# Patient Record
Sex: FEMALE | Race: BLACK OR AFRICAN AMERICAN | NOT HISPANIC OR LATINO | Employment: OTHER | URBAN - METROPOLITAN AREA
[De-identification: names, ages, dates, MRNs, and addresses within clinical notes are randomized per-mention and may not be internally consistent; named-entity substitution may affect disease eponyms.]

---

## 2023-05-19 ENCOUNTER — HOSPITAL ENCOUNTER (OUTPATIENT)
Dept: DATA CONVERSION | Facility: HOSPITAL | Age: 88
End: 2023-05-19
Attending: PHYSICAL MEDICINE & REHABILITATION | Admitting: PHYSICAL MEDICINE & REHABILITATION
Payer: MEDICARE

## 2023-05-19 DIAGNOSIS — M54.12 RADICULOPATHY, CERVICAL REGION: ICD-10-CM

## 2023-08-02 ENCOUNTER — HOSPITAL ENCOUNTER (OUTPATIENT)
Dept: DATA CONVERSION | Facility: HOSPITAL | Age: 88
End: 2023-08-02
Attending: PHYSICAL MEDICINE & REHABILITATION | Admitting: PHYSICAL MEDICINE & REHABILITATION
Payer: MEDICARE

## 2023-08-02 DIAGNOSIS — M96.1 POSTLAMINECTOMY SYNDROME, NOT ELSEWHERE CLASSIFIED: ICD-10-CM

## 2023-09-07 VITALS
HEART RATE: 79 BPM | RESPIRATION RATE: 16 BRPM | HEIGHT: 63 IN | SYSTOLIC BLOOD PRESSURE: 180 MMHG | DIASTOLIC BLOOD PRESSURE: 83 MMHG | WEIGHT: 175.04 LBS | TEMPERATURE: 98.6 F | BODY MASS INDEX: 31.02 KG/M2

## 2023-09-27 PROBLEM — M54.50 CHRONIC LOW BACK PAIN: Status: ACTIVE | Noted: 2023-09-27

## 2023-09-27 PROBLEM — I10 HYPERTENSION: Status: ACTIVE | Noted: 2023-09-27

## 2023-09-27 PROBLEM — M19.012 OSTEOARTHRITIS OF LEFT SHOULDER: Status: ACTIVE | Noted: 2023-09-27

## 2023-09-27 PROBLEM — M75.02 ADHESIVE BURSITIS OF LEFT SHOULDER: Status: ACTIVE | Noted: 2023-09-27

## 2023-09-27 PROBLEM — M77.8 LEFT SHOULDER TENDINITIS: Status: ACTIVE | Noted: 2023-09-27

## 2023-09-27 PROBLEM — M47.812 SPONDYLOSIS OF CERVICAL REGION WITHOUT MYELOPATHY OR RADICULOPATHY: Status: ACTIVE | Noted: 2023-09-27

## 2023-09-27 PROBLEM — M94.9 DISORDER OF BONE AND CARTILAGE: Status: ACTIVE | Noted: 2023-09-27

## 2023-09-27 PROBLEM — M48.061 LUMBAR CANAL STENOSIS: Status: ACTIVE | Noted: 2023-09-27

## 2023-09-27 PROBLEM — M79.7 FIBROMYALGIA: Status: ACTIVE | Noted: 2023-09-27

## 2023-09-27 PROBLEM — G57.92 NEURITIS OF FOOT, LEFT: Status: ACTIVE | Noted: 2023-09-27

## 2023-09-27 PROBLEM — M17.9 OSTEOARTHRITIS OF KNEE: Status: ACTIVE | Noted: 2023-09-27

## 2023-09-27 PROBLEM — M17.0 OSTEOARTHRITIS OF KNEES, BILATERAL: Status: ACTIVE | Noted: 2023-09-27

## 2023-09-27 PROBLEM — M43.10 ACQUIRED SPONDYLOLISTHESIS: Status: ACTIVE | Noted: 2023-09-27

## 2023-09-27 PROBLEM — F03.90 DEMENTIA (MULTI): Status: ACTIVE | Noted: 2023-09-27

## 2023-09-27 PROBLEM — M54.16 LUMBAR RADICULOPATHY: Status: ACTIVE | Noted: 2023-09-27

## 2023-09-27 PROBLEM — G56.02 LEFT CARPAL TUNNEL SYNDROME: Status: ACTIVE | Noted: 2023-09-27

## 2023-09-27 PROBLEM — M88.9 PAGET DISEASE OF BONE: Status: ACTIVE | Noted: 2023-09-27

## 2023-09-27 PROBLEM — M75.80 ROTATOR CUFF TENDINITIS: Status: ACTIVE | Noted: 2023-09-27

## 2023-09-27 PROBLEM — M54.12 LEFT CERVICAL RADICULOPATHY: Status: ACTIVE | Noted: 2023-09-27

## 2023-09-27 PROBLEM — G89.29 CHRONIC LOW BACK PAIN: Status: ACTIVE | Noted: 2023-09-27

## 2023-09-27 PROBLEM — G57.91 NEURITIS OF RIGHT FOOT: Status: ACTIVE | Noted: 2023-09-27

## 2023-09-27 PROBLEM — M54.12 CERVICAL NEURITIS: Status: ACTIVE | Noted: 2023-09-27

## 2023-09-27 PROBLEM — M54.2 CHRONIC NECK PAIN: Status: ACTIVE | Noted: 2023-09-27

## 2023-09-27 PROBLEM — S42.293A CLOSED FRACTURE OF ANATOMICAL NECK OF HUMERUS: Status: ACTIVE | Noted: 2023-09-27

## 2023-09-27 PROBLEM — R06.00 DYSPNEA: Status: ACTIVE | Noted: 2023-09-27

## 2023-09-27 PROBLEM — M19.079 ARTHRITIS OF MIDFOOT: Status: ACTIVE | Noted: 2023-09-27

## 2023-09-27 PROBLEM — R42 DIZZINESS: Status: ACTIVE | Noted: 2023-09-27

## 2023-09-27 PROBLEM — I87.2 VENOUS INSUFFICIENCY: Status: ACTIVE | Noted: 2023-09-27

## 2023-09-27 PROBLEM — M21.40 PES PLANUS: Status: ACTIVE | Noted: 2023-09-27

## 2023-09-27 PROBLEM — M96.1 POSTLAMINECTOMY SYNDROME, LUMBAR: Status: ACTIVE | Noted: 2023-09-27

## 2023-09-27 PROBLEM — S92.301A FRACTURE OF UNSPECIFIED METATARSAL BONE(S), RIGHT FOOT, INITIAL ENCOUNTER FOR CLOSED FRACTURE: Status: ACTIVE | Noted: 2023-09-27

## 2023-09-27 PROBLEM — M50.20 DISC DISPLACEMENT, CERVICAL: Status: ACTIVE | Noted: 2023-09-27

## 2023-09-27 PROBLEM — G89.29 CHRONIC NECK PAIN: Status: ACTIVE | Noted: 2023-09-27

## 2023-09-27 PROBLEM — M70.60 TROCHANTERIC BURSITIS: Status: ACTIVE | Noted: 2023-09-27

## 2023-09-27 PROBLEM — M89.9 DISORDER OF BONE AND CARTILAGE: Status: ACTIVE | Noted: 2023-09-27

## 2023-09-27 PROBLEM — G62.9 NEUROPATHY: Status: ACTIVE | Noted: 2023-09-27

## 2023-09-27 PROBLEM — H90.3 SENSORY HEARING LOSS, BILATERAL: Status: ACTIVE | Noted: 2023-09-27

## 2023-09-27 PROBLEM — D64.9 ANEMIA: Status: ACTIVE | Noted: 2023-09-27

## 2023-09-27 RX ORDER — DONEPEZIL HYDROCHLORIDE 5 MG/1
1 TABLET, FILM COATED ORAL NIGHTLY
COMMUNITY
Start: 2023-04-03

## 2023-09-27 RX ORDER — LOSARTAN POTASSIUM 25 MG/1
1 TABLET ORAL DAILY
COMMUNITY
Start: 2013-02-14

## 2023-09-27 RX ORDER — CEFTRIAXONE 2 G/1
INJECTION, POWDER, FOR SOLUTION INTRAMUSCULAR; INTRAVENOUS
COMMUNITY

## 2023-09-27 RX ORDER — LOSARTAN POTASSIUM 100 MG/1
1 TABLET ORAL DAILY
COMMUNITY
Start: 2023-06-05

## 2023-09-27 RX ORDER — VANCOMYCIN HYDROCHLORIDE 1 G/20ML
INJECTION, POWDER, LYOPHILIZED, FOR SOLUTION INTRAVENOUS
COMMUNITY

## 2023-09-27 RX ORDER — GABAPENTIN 100 MG/1
CAPSULE ORAL
COMMUNITY
Start: 2023-02-22

## 2023-09-27 RX ORDER — DULOXETIN HYDROCHLORIDE 60 MG/1
1 CAPSULE, DELAYED RELEASE ORAL 2 TIMES DAILY
COMMUNITY
Start: 2023-01-18

## 2023-09-27 RX ORDER — FUROSEMIDE 20 MG/1
TABLET ORAL
COMMUNITY

## 2023-09-27 RX ORDER — FUROSEMIDE 40 MG/1
TABLET ORAL
COMMUNITY
Start: 2023-07-26

## 2023-09-27 RX ORDER — DULOXETIN HYDROCHLORIDE 30 MG/1
1 CAPSULE, DELAYED RELEASE ORAL DAILY
COMMUNITY
Start: 2023-04-11

## 2023-09-27 RX ORDER — TRAZODONE HYDROCHLORIDE 50 MG/1
1 TABLET ORAL NIGHTLY PRN
COMMUNITY
Start: 2023-07-11

## 2023-09-27 RX ORDER — ESCITALOPRAM OXALATE 20 MG/1
1 TABLET ORAL DAILY
COMMUNITY
Start: 2023-07-11

## 2023-09-27 RX ORDER — OXYCODONE AND ACETAMINOPHEN 5; 325 MG/1; MG/1
1 TABLET ORAL 2 TIMES DAILY PRN
COMMUNITY

## 2023-09-27 RX ORDER — DONEPEZIL HYDROCHLORIDE 10 MG/1
1 TABLET, FILM COATED ORAL NIGHTLY
COMMUNITY
Start: 2023-06-05

## 2023-09-29 VITALS
DIASTOLIC BLOOD PRESSURE: 86 MMHG | HEART RATE: 65 BPM | SYSTOLIC BLOOD PRESSURE: 185 MMHG | TEMPERATURE: 97.2 F | RESPIRATION RATE: 16 BRPM

## 2023-09-30 NOTE — H&P
History of Present Illness:   History Present Illness:  Reason for surgery: Neck pain   HPI:    89 year old female scheduled for JOSE A.     Allergies:        Allergies:  ·  trazodone : Other    Home Medication Review:   Home Medications Reviewed: yes     Impression/Procedure:   ·  Impression and Planned Procedure: 89 year old F scheduled for JOSE A 7/T1 for cervical radiculitis.       ERAS (Enhanced Recovery After Surgery):  ·  ERAS Patient: no       Vital Signs:  Temperature C: 37 degrees C   Temperature F: 98.6 degrees F   Heart Rate: 79 beats per minute   Respiratory Rate: 16 breath per minute   Blood Pressure Systolic: 180 mm/Hg   Blood Pressure Diastolic: 83 mm/Hg     Physical Exam by System:    Respiratory/Thorax: Patent airways, CTAB, normal  breath sounds with good chest expansion, thorax symmetric   Cardiovascular: Regular, rate and rhythm, no murmurs,  2+ equal pulses of the extremities, normal S 1and S 2     Consent:   COVID-19 Consent:  ·  COVID-19 Risk Consent Surgeon has reviewed key risks related to the risk of rajan COVID-19 and if they contract COVID-19 what the risks are.     Attestation:   Note Completion:  I am a:  Resident/Fellow   Attending Attestation I saw and evaluated the patient.  I personally obtained the key and critical portions of the history and physical exam or was physically present for key and  critical portions performed by the resident/fellow. I reviewed the resident/fellow?s documentation and discussed the patient with the resident/fellow.  I agree with the resident/fellow?s medical decision making as documented in the note.   I personally evaluated the patient on 19-May-2023         Electronic Signatures:  Toni Epps (Fellow))  (Signed 19-May-2023 10:46)   Authored: History of Present Illness, Allergies, Home  Medication Review, Impression/Procedure, ERAS, Physical Exam, Consent, Note Completion  Ash Yousif)  (Signed 19-May-2023 10:48)   Authored: Note  Completion   Co-Signer: History of Present Illness, Allergies, Home Medication Review, Impression/Procedure, ERAS, Physical Exam, Consent, Note Completion      Last Updated: 19-May-2023 10:48 by Ash Yousif)

## 2023-09-30 NOTE — H&P
History of Present Illness:   History Present Illness:  Reason for surgery: Postlaminectomy syndrome   HPI:    Patient with lumbar postlaminectomy syndrome here for caudal epidural steroid injection.  No changes since last visit.    Allergies:        Allergies:  ·  trazodone : Other    Home Medication Review:   Home Medications Reviewed: yes     Impression/Procedure:   ·  Impression and Planned Procedure: Lumbar postlaminectomy syndrome-caudal JULI       ERAS (Enhanced Recovery After Surgery):  ·  ERAS Patient: no       Vital Signs:  Temperature C: 36.2 degrees C   Temperature F: 97.1 degrees F   Heart Rate: 65 beats per minute   Respiratory Rate: 16 breath per minute   Blood Pressure Systolic: 185 mm/Hg   Blood Pressure Diastolic: 86 mm/Hg     Physical Exam by System:    Constitutional: Well developed, awake/alert/oriented  x3, no distress, alert and cooperative   Eyes: PERRL, EOMI, clear sclera   ENMT: mucous membranes moist, no apparent injury,  no lesions seen   Head/Neck: Neck supple, no apparent injury, thyroid  without mass or tenderness, No JVD, trachea midline, no bruits   Respiratory/Thorax: unlabored breathing   Cardiovascular: no edema   Skin: Warm and dry, no lesions, no rashes     Consent:   COVID-19 Consent:  ·  COVID-19 Risk Consent Surgeon has reviewed key risks related to the risk of rajan COVID-19 and if they contract COVID-19 what the risks are.       Electronic Signatures:  Ash Yousif)  (Signed 02-Aug-2023 11:29)   Authored: History of Present Illness, Allergies, Home  Medication Review, Impression/Procedure, ERAS, Physical Exam, Consent, Note Completion      Last Updated: 02-Aug-2023 11:29 by Ash Yousif)

## 2023-10-01 NOTE — OP NOTE
Post Operative Note:     PreOp Diagnosis: Lumbar postlaminectomy syndrome   Post-Procedure Diagnosis: Lumbar postlaminectomy  syndrome   Procedure: 1.  Caudal JULI  2.   3.   4.   5.   Surgeon: Ash Yousif MD   Resident/Fellow/Other Assistant: none   Estimated Blood Loss (mL): none   Specimen: no   Complications: none apparent   Findings: expected anatomy     Operative Report Dictated:  Dictation: not applicable - note contains Operative  Report   Operative Report:    The patient was positioned comfortably into the supine position and was prepped and draped in the usual sterile manner.  Sterile precautions, including sterile gloves,  disposable cap and masks were worn for the procedure at all times. Skeletal landmarks were identified under fluoroscopy. There was no evidence of infection at the site(s) of needle insertion.  A final time-out was done.  The skin and subcutaneous tissue  at insertion site was anesthetized with 1% lidocaine with or without sodium bicarbonate.  Under fluoroscopic guidance, the needle listed below was placed into the epidural space through the caudal approach.  If an Epimed Catheter was used it was advanced  to the below level.  Radio-opaque contrast was utilized to confirm position.  Appropriate epidural spread was observed without vascular uptake or spread suggestive of an intrathecal injection.   Blood was not aspirated.  CSF was not aspirated.  Paresthesias  were not elicited.  A therapeutic solution as indicated below was injected.  If used, the catheter was flushed and the catheter and needle were removed as a unit with tip of the catheter noted to be intact.  Pressure was held over the site until hemostasis  was achieved, and after ensuring hemostasis and the absence of hematoma, an adhesive bandage was applied to the site of needle insertion.  Preservative-free solutions were utilized in the epidural space.      Needle type and catheter: [22 gauge Quincke Spinal]       Level catheter advanced if used: [N/A]  Medications [9 mL of 0.5% lidocaine and 40 mg methylprednisolone]      Electronic Signatures:  Ash Yousif (MD)  (Signed 02-Aug-2023 12:37)   Authored: Post Operative Note, Note Completion      Last Updated: 02-Aug-2023 12:37 by Ash Yousif)

## 2023-10-02 ENCOUNTER — HOSPITAL ENCOUNTER (OUTPATIENT)
Dept: RADIOLOGY | Facility: HOSPITAL | Age: 88
Discharge: HOME | End: 2023-10-02
Payer: MEDICARE

## 2023-10-02 ENCOUNTER — OFFICE VISIT (OUTPATIENT)
Dept: ORTHOPEDIC SURGERY | Facility: HOSPITAL | Age: 88
End: 2023-10-02
Payer: MEDICARE

## 2023-10-02 DIAGNOSIS — M17.9 OSTEOARTHRITIS OF KNEE, UNSPECIFIED: ICD-10-CM

## 2023-10-02 DIAGNOSIS — M17.12 PRIMARY OSTEOARTHRITIS OF LEFT KNEE: ICD-10-CM

## 2023-10-02 DIAGNOSIS — S83.105D LEFT KNEE DISLOCATION, SUBSEQUENT ENCOUNTER: Primary | ICD-10-CM

## 2023-10-02 PROCEDURE — 99214 OFFICE O/P EST MOD 30 MIN: CPT | Performed by: ORTHOPAEDIC SURGERY

## 2023-10-02 PROCEDURE — 73560 X-RAY EXAM OF KNEE 1 OR 2: CPT | Mod: LEFT SIDE | Performed by: RADIOLOGY

## 2023-10-02 PROCEDURE — 73560 X-RAY EXAM OF KNEE 1 OR 2: CPT | Mod: LT,FY

## 2023-10-02 PROCEDURE — 99204 OFFICE O/P NEW MOD 45 MIN: CPT | Performed by: ORTHOPAEDIC SURGERY

## 2023-10-02 NOTE — OP NOTE
Post Operative Note:     PreOp Diagnosis: Cervical neuritis   Post-Procedure Diagnosis: Cervical neuritis   Procedure: 1.  C7-T1 JULI  2.  Moderate sedation  3.   4.   5.   Surgeon: Ash Yousif MD   Resident/Fellow/Other Assistant: Toni Epps MD   Anesthesia: Local and 1 mg midazolam   Estimated Blood Loss (mL): none   Specimen: no   Complications: none apparent   Findings: expected anatomy     Operative Report Dictated:  Dictation: not applicable - note contains Operative  Report   Operative Report:    After informed consent was obtained, the patient was brought to the operating room and placed in the prone position. Standard blood pressure, heart rate and pulse  oximetry monitors were applied. The back of the neck area was prepped and draped in the usual sterile fashion. Using fluoroscopic guidance, the skin and subcutaneous tissue overlying needle trajectory to the below interlaminar epidural space was anesthetized  with 0.5% lidocaine. An 18-gauge Tuohy needle was then advanced into the below interlaminar location, and the epidural space was accessed using small advances with the needle. Entry into the epidural space was confirmed using loss of resistance technique  with a glass syringe and 2 cc of air. Injection of contrast revealed appropriate spread in the epidural space without vascular uptake and was confirmed in the AP and lateral/contralateral views. Subsequently, the below medications were injected. The patient  tolerated the procedure well. The needle was removed. Band-aid was applied.   The patient was then transferred in stable condition to the PACU. The patient will call us with report over amount of pain control in the near future.    Level [C7-T1]    Medication [Dexamethasone 10mg/ml and 1 mL normal saline]    Attestation:   Note Completion:  Attending Attestation I was present for the entire procedure         Electronic Signatures:  Ash Yousif (MD)  (Signed 19-May-2023  11:07)   Authored: Post Operative Note, Note Completion      Last Updated: 19-May-2023 11:07 by Ash Yousif)

## 2023-10-02 NOTE — PROGRESS NOTES
Chief complaint I twisted getting out of a chair and dislocated my left knee 2 months ago.    History this is a 89-year-old female who twisted her left knee 2 months ago.  She was seen in the emergency room and x-rays were taken which definitely showed a medial dislocation or partial dislocation of the femur.  She was reduced in the emergency room and x-rays showed a anatomic reduction of the knee joint in a splint.  I have no follow-up after this when she left the hospital.  We have not seen her back in 2 months.  She has been in a small posterior splint.  But continued into the ankle as well.  I do not think she has had the splint changed in 2 months but she has been working with physical therapy and they redid the Ace wrap.  She denies any numbness or tingling.    Her physical exam her splint was removed.  Her medial lateral stability is about 1-2+.  I do not see any gross instability anterior posteriorly either.  Unable to move her knee about 0 to 50 degrees patella appears to track appropriately.  She has good dorsiflexion and plantarflexion of the ankle.  And she is very mobile she moves herself right onto the table standing on her right side even with the splint on she is very mobile.the patient has a palpable posterior tibial pulse and dorsalis pedis pulse    X-rays of the of the knee AP and lateral in the splint reveal anatomically reduced knee joint.  However.  I did review the x-rays from 2 months ago which definitely showed a dislocation of the knee.    Assessment this is a 89-year-old female who appears much younger than her stated age and is very mobile even with the splint on her right leg.  She had a very low energy partial knee dislocation and has been splinted for 2 months with anatomic reduction.  I think it is time to at least get her out of the splint and start some range of motion with a hinged knee brace.  I Juhi write her for physical therapy.  I let her partial weight-bear and then fully  weight-bear over the next 2 weeks.  I think she should wear the brace except when she showers.  I think she should wear the brace during physical therapy.  I am going to see her back again in 6 weeks.

## 2023-10-02 NOTE — LETTER
October 3, 2023     Chelsea Hsu MD  [address]  [facility] [address]    Patient: Sole Carvajal   YOB: 1932   Date of Visit: 10/2/2023       Dear Dr. Chelsea Hsu MD:    Thank you for referring Sole Carvajal to me for evaluation. Below are my notes for this consultation.  If you have questions, please do not hesitate to call me. I look forward to following your patient along with you.       Sincerely,     Ash Dasilva MD      CC: No Recipients  ______________________________________________________________________________________    Chief complaint I twisted getting out of a chair and dislocated my left knee 2 months ago.    History this is a 89-year-old female who twisted her left knee 2 months ago.  She was seen in the emergency room and x-rays were taken which definitely showed a medial dislocation or partial dislocation of the femur.  She was reduced in the emergency room and x-rays showed a anatomic reduction of the knee joint in a splint.  I have no follow-up after this when she left the hospital.  We have not seen her back in 2 months.  She has been in a small posterior splint.  But continued into the ankle as well.  I do not think she has had the splint changed in 2 months but she has been working with physical therapy and they redid the Ace wrap.  She denies any numbness or tingling.    Her physical exam her splint was removed.  Her medial lateral stability is about 1-2+.  I do not see any gross instability anterior posteriorly either.  Unable to move her knee about 0 to 50 degrees patella appears to track appropriately.  She has good dorsiflexion and plantarflexion of the ankle.  And she is very mobile she moves herself right onto the table standing on her right side even with the splint on she is very mobile.the patient has a palpable posterior tibial pulse and dorsalis pedis pulse    X-rays of the of the knee AP and lateral in the splint reveal  anatomically reduced knee joint.  However.  I did review the x-rays from 2 months ago which definitely showed a dislocation of the knee.    Assessment this is a 89-year-old female who appears much younger than her stated age and is very mobile even with the splint on her right leg.  She had a very low energy partial knee dislocation and has been splinted for 2 months with anatomic reduction.  I think it is time to at least get her out of the splint and start some range of motion with a hinged knee brace.  I Juhi write her for physical therapy.  I let her partial weight-bear and then fully weight-bear over the next 2 weeks.  I think she should wear the brace except when she showers.  I think she should wear the brace during physical therapy.  I am going to see her back again in 6 weeks.

## 2023-10-12 PROCEDURE — L1833 KO ADJ JNT POS R SUP PRE OTS: HCPCS | Performed by: ORTHOPAEDIC SURGERY

## 2023-11-02 ENCOUNTER — OFFICE VISIT (OUTPATIENT)
Dept: ORTHOPEDIC SURGERY | Facility: CLINIC | Age: 88
End: 2023-11-02
Payer: MEDICARE

## 2023-11-02 ENCOUNTER — ANCILLARY PROCEDURE (OUTPATIENT)
Dept: RADIOLOGY | Facility: CLINIC | Age: 88
End: 2023-11-02
Payer: MEDICARE

## 2023-11-02 DIAGNOSIS — M17.11 PRIMARY OSTEOARTHRITIS OF RIGHT KNEE: ICD-10-CM

## 2023-11-02 DIAGNOSIS — S83.105D LEFT KNEE DISLOCATION, SUBSEQUENT ENCOUNTER: Primary | ICD-10-CM

## 2023-11-02 DIAGNOSIS — M17.9 OSTEOARTHRITIS OF KNEE, UNSPECIFIED: ICD-10-CM

## 2023-11-02 PROCEDURE — 99213 OFFICE O/P EST LOW 20 MIN: CPT | Performed by: ORTHOPAEDIC SURGERY

## 2023-11-02 PROCEDURE — 73560 X-RAY EXAM OF KNEE 1 OR 2: CPT | Mod: 50

## 2023-11-02 PROCEDURE — 3074F SYST BP LT 130 MM HG: CPT | Performed by: ORTHOPAEDIC SURGERY

## 2023-11-02 PROCEDURE — 99213 OFFICE O/P EST LOW 20 MIN: CPT | Mod: 25 | Performed by: ORTHOPAEDIC SURGERY

## 2023-11-02 PROCEDURE — 3078F DIAST BP <80 MM HG: CPT | Performed by: ORTHOPAEDIC SURGERY

## 2023-11-02 PROCEDURE — L1812 KO ELASTIC W/JOINTS PRE OTS: HCPCS | Performed by: ORTHOPAEDIC SURGERY

## 2023-11-02 PROCEDURE — 73560 X-RAY EXAM OF KNEE 1 OR 2: CPT | Mod: BILATERAL PROCEDURE | Performed by: RADIOLOGY

## 2023-11-03 NOTE — PROGRESS NOTES
History 89-year-old who dislocated her knee 3 months ago and has had home physical therapy.    This patient had a true dislocation of the femoral-tibial joint and had closed reduction and has been treated in a hinged knee brace.  She currently comes in with a neoprene sleeve with mild not much stability.  She says that the big brace is too cumbersome for her and she cannot walk in therapy.  This patient lives alone and drives.    Her physical examination reveals that unable to get the knee out to about almost full knee extension and about 90 degrees of knee flexion.  She still has a little bit of instability maybe 1-2+ varus valgus in full extension and 30 degrees of flexion.  I cannot get a positive Lachman it feels pretty tight.    X-rays show the need to be 100% located with no evidence of translation or subluxation of rotation.    Assessment is 3-month ago had a history of leg near the knee dislocation left which was treated with closed reduction and stabilization with bracing.  Doing reasonably well.    Plan continue weightbearing as tolerated.  I have given her a more substantial but easier to apply knee brace it does have medial lateral stability but it is a neoprene sleeve.  I think this will help her with gait and with some stability.  I would like her to go to physical therapy as outpatient she can go degree relative more close to her house and they can do strengthening range of motion and really gait training and balance.  I want to see her back in the office in about 2 months with a repeat x-ray of the knee at that time.

## 2023-11-15 ENCOUNTER — HOSPITAL ENCOUNTER (OUTPATIENT)
Dept: OPERATING ROOM | Facility: CLINIC | Age: 88
Discharge: HOME | End: 2023-11-15
Payer: MEDICARE

## 2023-11-15 ENCOUNTER — ANCILLARY PROCEDURE (OUTPATIENT)
Dept: RADIOLOGY | Facility: CLINIC | Age: 88
End: 2023-11-15
Payer: MEDICARE

## 2023-11-15 VITALS
HEIGHT: 62 IN | DIASTOLIC BLOOD PRESSURE: 83 MMHG | OXYGEN SATURATION: 98 % | BODY MASS INDEX: 29.98 KG/M2 | WEIGHT: 162.92 LBS | SYSTOLIC BLOOD PRESSURE: 186 MMHG | HEART RATE: 78 BPM | RESPIRATION RATE: 16 BRPM | TEMPERATURE: 97.7 F

## 2023-11-15 DIAGNOSIS — M96.1 POSTLAMINECTOMY SYNDROME, LUMBAR: Primary | ICD-10-CM

## 2023-11-15 DIAGNOSIS — M54.12 CERVICAL NEURITIS: Primary | ICD-10-CM

## 2023-11-15 PROCEDURE — 2550000001 HC RX 255 CONTRASTS: Performed by: PHYSICAL MEDICINE & REHABILITATION

## 2023-11-15 PROCEDURE — A4216 STERILE WATER/SALINE, 10 ML: HCPCS | Performed by: PHYSICAL MEDICINE & REHABILITATION

## 2023-11-15 PROCEDURE — 62321 NJX INTERLAMINAR CRV/THRC: CPT | Performed by: PHYSICAL MEDICINE & REHABILITATION

## 2023-11-15 PROCEDURE — 96372 THER/PROPH/DIAG INJ SC/IM: CPT | Performed by: PHYSICAL MEDICINE & REHABILITATION

## 2023-11-15 PROCEDURE — 2500000004 HC RX 250 GENERAL PHARMACY W/ HCPCS (ALT 636 FOR OP/ED): Performed by: PHYSICAL MEDICINE & REHABILITATION

## 2023-11-15 PROCEDURE — 77003 FLUOROGUIDE FOR SPINE INJECT: CPT | Mod: RSC

## 2023-11-15 PROCEDURE — 2500000005 HC RX 250 GENERAL PHARMACY W/O HCPCS: Performed by: PHYSICAL MEDICINE & REHABILITATION

## 2023-11-15 RX ORDER — SODIUM CHLORIDE 9 MG/ML
INJECTION, SOLUTION INTRAMUSCULAR; INTRAVENOUS; SUBCUTANEOUS AS NEEDED
Status: COMPLETED | OUTPATIENT
Start: 2023-11-15 | End: 2023-11-15

## 2023-11-15 RX ORDER — DEXAMETHASONE SODIUM PHOSPHATE 100 MG/10ML
INJECTION INTRAMUSCULAR; INTRAVENOUS AS NEEDED
Status: COMPLETED | OUTPATIENT
Start: 2023-11-15 | End: 2023-11-15

## 2023-11-15 RX ORDER — LIDOCAINE HYDROCHLORIDE 5 MG/ML
INJECTION, SOLUTION INFILTRATION; PERINEURAL AS NEEDED
Status: COMPLETED | OUTPATIENT
Start: 2023-11-15 | End: 2023-11-15

## 2023-11-15 RX ORDER — MIDAZOLAM HYDROCHLORIDE 1 MG/ML
1 INJECTION INTRAMUSCULAR; INTRAVENOUS AS NEEDED
Status: DISCONTINUED | OUTPATIENT
Start: 2023-11-15 | End: 2023-11-16 | Stop reason: HOSPADM

## 2023-11-15 RX ADMIN — SODIUM CHLORIDE 5 ML: 9 INJECTION INTRAMUSCULAR; INTRAVENOUS; SUBCUTANEOUS at 13:32

## 2023-11-15 RX ADMIN — LIDOCAINE HYDROCHLORIDE 3 ML: 5 INJECTION, SOLUTION INFILTRATION; PERINEURAL at 13:30

## 2023-11-15 RX ADMIN — IOHEXOL 240 MG: 240 INJECTION, SOLUTION INTRATHECAL; INTRAVASCULAR; INTRAVENOUS; ORAL at 13:31

## 2023-11-15 RX ADMIN — DEXAMETHASONE SODIUM PHOSPHATE 10 MG: 10 INJECTION INTRAMUSCULAR; INTRAVENOUS at 13:31

## 2023-11-15 ASSESSMENT — PAIN - FUNCTIONAL ASSESSMENT: PAIN_FUNCTIONAL_ASSESSMENT: 0-10

## 2023-11-15 ASSESSMENT — PAIN SCALES - GENERAL: PAINLEVEL_OUTOF10: 3

## 2023-11-15 NOTE — H&P
Patient ID: Patient with cervical neuritis who presents for the scheduled procedure.  No changes since last visit                GENERAL EXAM  Vital Signs: Vital signs to include heart rate, respiration rate, blood pressure, and temperature were reviewed.  General Appearance:  Awake, alert, healthy appearing, well developed, No acute distress.  Head: Normocephalic without evidence of head injury.  Neck: The appearance of the neck was normal without swelling with a midline trachea.  Eyes: The eyelids and eyebrows exhibited no abnormalities.  Pupils were not pin-point.  Sclera was without icterus.  Lungs: Respiration rhythm and depth was normal.  Respiratory movements were normal without labored breathing.  Cardiovascular: No peripheral edema was present.    Neurological: Patient was oriented to time, place, and person.  Speech was normal.  Balance, gait, and stance were unremarkable.    Psychiatric: Appearance was normal with appropriate dress.  Mood was euthymic and affect was normal.  Skin: Affected regions were without ecchymosis or skin lesions.        Physical exam as above except:        Assessment/Plan    Patient with cervical neuritis here for cervical JULI

## 2023-11-15 NOTE — PROCEDURES
General    Date/Time: 11/15/2023 1:28 PM    Performed by: Ash Yousif MD  Authorized by: Ash Yousif MD    Consent:     Consent obtained:  Written    Consent given by:  Patient    Risks, benefits, and alternatives were discussed: yes      Risks discussed:  Bleeding, infection, pain and nerve damage    Alternatives discussed:  No treatment, delayed treatment and alternative treatment  Universal protocol:     Procedure explained and questions answered to patient or proxy's satisfaction: yes      Relevant documents present and verified: yes      Test results available: yes      Imaging studies available: yes      Required blood products, implants, devices, and special equipment available: yes      Site/side marked: yes      Immediately prior to procedure, a time out was called: yes      Patient identity confirmed:  Verbally with patient, hospital-assigned identification number and arm band  Procedure specific details:      Cervical JULI    After informed consent was obtained, the patient was brought to the operating room and placed in the prone position. Standard blood pressure, heart rate and pulse oximetry monitors were applied. The back of the neck area was prepped and draped in the usual sterile fashion. Using fluoroscopic guidance, the skin and subcutaneous tissue overlying needle trajectory to the below interlaminar epidural space was anesthetized with 0.5% lidocaine. An 18-gauge Tuohy needle was then advanced into the below interlaminar location, and the epidural space was accessed using small advances with the needle. Entry into the epidural space was confirmed using loss of resistance technique with a glass syringe and 2 cc of air. Injection of contrast revealed appropriate spread in the epidural space without vascular uptake and was confirmed in the AP and lateral/contralateral views. Subsequently, the below medications were injected. The patient tolerated the procedure well. The needle was removed.  Band-aid was applied.   The patient was then transferred in stable condition to the PACU. The patient will call us with report over amount of pain control in the near future.    Level C6-7  Medication [Dexamethasone 10mg/ml and 1 mL normal saline]

## 2023-11-21 ENCOUNTER — EVALUATION (OUTPATIENT)
Dept: PHYSICAL THERAPY | Facility: CLINIC | Age: 88
End: 2023-11-21
Payer: MEDICARE

## 2023-11-21 DIAGNOSIS — M25.562 LEFT KNEE PAIN: ICD-10-CM

## 2023-11-21 DIAGNOSIS — S83.105D LEFT KNEE DISLOCATION, SUBSEQUENT ENCOUNTER: ICD-10-CM

## 2023-11-21 DIAGNOSIS — M25.9 WALKING DIFFICULTY DUE TO KNEE JOINT DISORDER: Primary | ICD-10-CM

## 2023-11-21 DIAGNOSIS — M17.12 PRIMARY OSTEOARTHRITIS OF LEFT KNEE: ICD-10-CM

## 2023-11-21 DIAGNOSIS — R26.2 WALKING DIFFICULTY DUE TO KNEE JOINT DISORDER: Primary | ICD-10-CM

## 2023-11-21 PROCEDURE — 97161 PT EVAL LOW COMPLEX 20 MIN: CPT | Mod: GP | Performed by: PHYSICAL THERAPIST

## 2023-11-21 PROCEDURE — 97110 THERAPEUTIC EXERCISES: CPT | Mod: GP | Performed by: PHYSICAL THERAPIST

## 2023-11-21 ASSESSMENT — ENCOUNTER SYMPTOMS
OCCASIONAL FEELINGS OF UNSTEADINESS: 0
DEPRESSION: 0
LOSS OF SENSATION IN FEET: 0

## 2023-11-21 NOTE — PROGRESS NOTES
Physical Therapy    Physical Therapy Evaluation and Treatment      Patient Name: Sole Carvajal  MRN: [id number]  Today's Date: 11/21/2023  Visit 1  Time Calculation  Start Time: 1445  Stop Time: 1545  Time Calculation (min): 60 min    Assessment:    Patient presents with clinical signs and symptoms  L knee  patellar lateral dislocation .  These impairments affect ADLs, work, recreational activity, exercise, transfer ability, ambulation, and sleep function that requires skilled PT intervention to resolve and enable patient to return to previous level of function. Factors that may affect progress in PT are chronic pain, obesity, medical co-morbidities,, and patient compliance.  Patient response to initial treatment of  education and therapeutic exercise  was understood and performed well.e    Plan:  OP PT Plan  Treatment/Interventions: Cryotherapy, Education/ Instruction, Electrical stimulation, Hot pack, Manual therapy, Neuromuscular re-education, Self care/ home management, Taping techniques, Therapeutic activities, Therapeutic exercises  PT Plan: Skilled PT  PT Frequency: 2 times per week  Duration: 12  Onset Date: 08/28/23  Certification Period Start Date: 11/21/23  Certification Period End Date: 02/19/24  Rehab Potential: Good  Plan of Care Agreement: Patient    Current Problem:   1. Walking difficulty due to knee joint disorder  Follow Up In Physical Therapy      2. Primary osteoarthritis of left knee  Referral to Physical Therapy    Follow Up In Physical Therapy      3. Left knee dislocation, subsequent encounter  Referral to Physical Therapy    Follow Up In Physical Therapy          Subjective    Sole Carvajal had a fall onto L knee dislocating it in aug 2023. She was treated in a long leg immobilizer for 3 wks and is now in a Breg brace with patellar relief. She c/o L knee stiffness and swelling. She c/o R LE  whole leg sensitivity since she has been weight bearing more on R LE since injury.She had a R TKA 6  yrs ago     Pain:   7/10  Home Living:   Lives alone and drives a car. She lives in 1 story home but laundry in basement  Prior Level of Function:  Sole Carvajal is  independent all ADLs        Objective   Cognition:   A + O x3  Observation:  Brace Breg hinged knee brace    Hip and ankle joint clearance:  Clear   Posture:  genu varus    genu valgus  pelvic obliquity   hyperlordosis    lacks lumbar lordosis    leg length discrepancy    hyperextension    Single leg stance:  Eyes open  R  sec   L  secNT    Knee ROM: Flexion  AROM  R 90 P  L  98 deg , Extension  AROM  R  0 L  0 deg      Knee Strength:  Flex  R 4/5   L4 /5,  Ext R  4/5  L3+ /5 P     Hip Strength:  Abduction  R  /5  L  /5           Extension  R /5    L  /5         Flexion R  /5   L  /5 NT    Ligament testing:  Lachman's  R  L   medial collateral    R  L  lateral collateral   R  L    Jono's   R  L     NT    Accessory joint mobility: L patella tracks laterally    Palpation: tenderness to entire R LE > than L   Trigger points:  VMO   rectus femoris  VL    gatroc  soleus   glut med   glut max   ITB  Gait: antalgic L LE with cane in R hand   Swelling   circumference:  joint effusion  yes  Mid patella   R  39.5 cm L 40 cm        Special Tests:   SLR quad lag    no       Integument: intact    Outcome Measures:  LEFS:44/80    Treatments:  There ex:  Seated AROM LAQ 10 x   Supine facilitated quad set with concurrent isometric hip adduction 10 x 5 sec hold    EDUCATION:   xtensive education regarding mechanism of injury, relevant functional anatomy, treatment program rational, self management, HEP, and POC      Goals:  1.Decrease L knee pain to 0-2/10 severity level   2. Increase L knee extension strength to   4/5 MMT grade no pain  3. Improve ambulation ability to least restrictive device/ no device on level surface and steps at least 300   feet community distances   4. Ind Home exercise program   5. Improve outcome score by 10 points  6. Sole Carvajal will go  sit to stand x 5 in 20 sec without L knee pain

## 2023-11-29 PROBLEM — M25.9 WALKING DIFFICULTY DUE TO KNEE JOINT DISORDER: Status: ACTIVE | Noted: 2023-11-29

## 2023-11-29 PROBLEM — R26.2 WALKING DIFFICULTY DUE TO KNEE JOINT DISORDER: Status: ACTIVE | Noted: 2023-11-29

## 2023-11-29 NOTE — PROGRESS NOTES
Physical Therapy    Physical Therapy Evaluation and Treatment      Patient Name: Sole Carvajal  MRN: [id number]  Today's Date: 11/30/23  Visit 2       Assessment:  Treatment today focused on pain management of bilat knees. She assist to adjust L knee brace properly to stay in position when she nwalks    Plan:   Monitor pain and treat accordingly    Current Problem:   1. Walking difficulty due to knee joint disorder        2. Left knee pain            Subjective    Sole Carvajal c/o severe L > R knee pain . She is in distress today . The brace doesn't stay up on L leg and was down to ankle when she walked into clinic     Pain:   7/10  Home Living:   Lives alone and drives a car. She lives in 1 story home but laundry in basement  Prior Level of Function:  Sole Carvajal is  independent all ADLs        Objective   Cognition:   A + O x3  Observation:  Brace Breg hinged knee brace was down to ankle. L knee more swollen than R      Treatments:  Modality:   Interferential estim to L knee 20 min   Cold packs to anterior bilat knees concurrent with stim  There ex:  Seated AROM LAQ 10 x with isometric hold 5 sec   Supine facilitated quad set with concurrent isometric hip adduction 10 x 5 sec hold  Heel slides x 5 each    EDUCATION:   Discussed rational for use of cold as anesthetic and estim for pain management to bilat knees    Goals:  1.Decrease L knee pain to 0-2/10 severity level   2. Increase L knee extension strength to   4/5 MMT grade no pain  3. Improve ambulation ability to least restrictive device/ no device on level surface and steps at least 300   feet community distances   4. Ind Home exercise program   5. Improve outcome score by 10 points  6. Sole Carvajal will go sit to stand x 5 in 20 sec without L knee pain

## 2023-11-30 ENCOUNTER — TREATMENT (OUTPATIENT)
Dept: PHYSICAL THERAPY | Facility: CLINIC | Age: 88
End: 2023-11-30
Payer: MEDICARE

## 2023-11-30 DIAGNOSIS — M17.12 PRIMARY OSTEOARTHRITIS OF LEFT KNEE: ICD-10-CM

## 2023-11-30 DIAGNOSIS — S83.105D LEFT KNEE DISLOCATION, SUBSEQUENT ENCOUNTER: ICD-10-CM

## 2023-11-30 DIAGNOSIS — M25.562 LEFT KNEE PAIN: ICD-10-CM

## 2023-11-30 DIAGNOSIS — M25.9 WALKING DIFFICULTY DUE TO KNEE JOINT DISORDER: Primary | ICD-10-CM

## 2023-11-30 DIAGNOSIS — R26.2 WALKING DIFFICULTY DUE TO KNEE JOINT DISORDER: Primary | ICD-10-CM

## 2023-11-30 PROCEDURE — 97014 ELECTRIC STIMULATION THERAPY: CPT | Mod: GP | Performed by: PHYSICAL THERAPIST

## 2023-11-30 PROCEDURE — 97010 HOT OR COLD PACKS THERAPY: CPT | Mod: GP | Performed by: PHYSICAL THERAPIST

## 2023-11-30 PROCEDURE — 97110 THERAPEUTIC EXERCISES: CPT | Mod: GP | Performed by: PHYSICAL THERAPIST

## 2023-12-11 NOTE — PROGRESS NOTES
"Physical Therapy    Physical Therapy Evaluation and Treatment      Patient Name: Sole Carvajal  MRN: [id number]  Today's Date: 12/12/23  Visit 3       Assessment:  Sole Carvajal fractured L humerus in fall secondary to L knee giving way at home. She was able to exercise LE more comfortably today and we actually progressed to some Closed kinetic chain activities to increase knee stability. However, I am concerned about her risk for falling and ability to drive to appointments with 1 arm as the arm has a soft cast and needs to be immobilized    Plan:   Cancel next PT appointment this week secondary other orthopedic follow up appointment and for safety reasons. She perform her Home exercise program and call regarding orthopedic consultation.    Current Problem:   1. Walking difficulty due to knee joint disorder        2. Chronic pain of left knee              Subjective    Sole Carvajal fell getting out of bed secondary to L knee giving out on Wednesday night. She fractured her humerus and was treated in ER at [facility] and stayed 2 nights. She c/o L knee feeling weak.  She is unable to apply L knee brace now secondary to L arm Fx immobilization.  Pain:  6-7/10  Home Living:   Lives alone and drives a car. She lives in 1 story home but laundry in basement     Objective     Cognition:   A + O x3  Observation:  Sole Carvajal wearing an upper arm cast and in sling    Treatments:  Rewrapped L UE soft cast elesctic bandages to reduce pressure at anterior elbow crease  Modality:     There ex:  Seated AROM LAQ 2x10  3 lbs  with isometric hold 5 sec   Seated facilitated quad set  10 x 5 sec hold  Heel slides x 5 each  Kickstand stance 5 x 20 sec each  Alternate foot taps 4\"  Sit to stand from chair with 1 airex mat elevation 2x5    EDUCATION:   Discussed rational for use of cold as anesthetic and estim for pain management to bilat knees    Goals:  1.Decrease L knee pain to 0-2/10 severity level   2. Increase L knee " extension strength to   4/5 MMT grade no pain  3. Improve ambulation ability to least restrictive device/ no device on level surface and steps at least 300   feet community distances   4. Ind Home exercise program   5. Improve outcome score by 10 points  6. Sole Carvajal will go sit to stand x 5 in 20 sec without L knee pain

## 2023-12-12 ENCOUNTER — TREATMENT (OUTPATIENT)
Dept: PHYSICAL THERAPY | Facility: CLINIC | Age: 88
End: 2023-12-12
Payer: MEDICARE

## 2023-12-12 DIAGNOSIS — G89.29 CHRONIC PAIN OF LEFT KNEE: ICD-10-CM

## 2023-12-12 DIAGNOSIS — M25.562 CHRONIC PAIN OF LEFT KNEE: ICD-10-CM

## 2023-12-12 DIAGNOSIS — R26.2 WALKING DIFFICULTY DUE TO KNEE JOINT DISORDER: Primary | ICD-10-CM

## 2023-12-12 DIAGNOSIS — M25.9 WALKING DIFFICULTY DUE TO KNEE JOINT DISORDER: Primary | ICD-10-CM

## 2023-12-12 PROCEDURE — 97110 THERAPEUTIC EXERCISES: CPT | Mod: GP | Performed by: PHYSICAL THERAPIST

## 2023-12-14 ENCOUNTER — APPOINTMENT (OUTPATIENT)
Dept: PHYSICAL THERAPY | Facility: CLINIC | Age: 88
End: 2023-12-14
Payer: MEDICARE

## 2023-12-18 ENCOUNTER — HOSPITAL ENCOUNTER (OUTPATIENT)
Dept: RADIOLOGY | Facility: HOSPITAL | Age: 88
Discharge: HOME | End: 2023-12-18
Payer: MEDICARE

## 2023-12-18 ENCOUNTER — OFFICE VISIT (OUTPATIENT)
Dept: ORTHOPEDIC SURGERY | Facility: HOSPITAL | Age: 88
End: 2023-12-18
Payer: MEDICARE

## 2023-12-18 DIAGNOSIS — S83.105D: ICD-10-CM

## 2023-12-18 PROCEDURE — 99213 OFFICE O/P EST LOW 20 MIN: CPT | Performed by: ORTHOPAEDIC SURGERY

## 2023-12-18 PROCEDURE — 73560 X-RAY EXAM OF KNEE 1 OR 2: CPT | Mod: LT,FY

## 2023-12-18 PROCEDURE — 1159F MED LIST DOCD IN RCRD: CPT | Performed by: ORTHOPAEDIC SURGERY

## 2023-12-18 PROCEDURE — 73560 X-RAY EXAM OF KNEE 1 OR 2: CPT | Mod: LEFT SIDE | Performed by: STUDENT IN AN ORGANIZED HEALTH CARE EDUCATION/TRAINING PROGRAM

## 2023-12-18 PROCEDURE — 1125F AMNT PAIN NOTED PAIN PRSNT: CPT | Performed by: ORTHOPAEDIC SURGERY

## 2023-12-18 PROCEDURE — 1158F ADVNC CARE PLAN TLK DOCD: CPT | Performed by: ORTHOPAEDIC SURGERY

## 2023-12-19 ENCOUNTER — APPOINTMENT (OUTPATIENT)
Dept: PHYSICAL THERAPY | Facility: CLINIC | Age: 88
End: 2023-12-19
Payer: MEDICARE

## 2023-12-19 NOTE — PROGRESS NOTES
Chief complaint my left knee is doing better and I am not wearing my brace.    History 89-year-old female who apparently injured her left shoulder but she is seeing another physician for that.  She presents today in with a sling on.  She is not wearing a brace on her left knee but denies any type of instability.  The knee still feels weak to her but it does not buckle she does have some mild to moderate pain with weightbearing.    Physical examination of her knee today shows that she has full extension and about 95 degrees of flexion.  She still has about 1-2+ instability varus valgus in full extension and 30 degrees of flexion.  She has a negative Lachman on my exam her patella tracks well.  Her quadricep muscle is coming back pretty well.    X-rays do need to be 100% located.  She does have mild to moderate osteoarthritis of the left knee.  No obvious fractures.    Assessment 4-month history of having a left knee complete dislocation.  Mild to moderate osteoarthritis left knee with still some instability.    Plan I talked to the patient about continuing her physical therapy.  I do not know if the therapist understands that she had a complete dislocation but I do believe she is improving with this therapist.  I am reluctant to give her a corticosteroid injection because of the amount of healing that is still undergo going her knee.  However, I did say I would see her back in approximately 3 months.  I would like to get weightbearing x-rays of the left knee.  And at that time I think a corticosteroid injection may be justifiable

## 2023-12-21 ENCOUNTER — TREATMENT (OUTPATIENT)
Dept: PHYSICAL THERAPY | Facility: CLINIC | Age: 88
End: 2023-12-21
Payer: MEDICARE

## 2023-12-21 DIAGNOSIS — M17.12 PRIMARY OSTEOARTHRITIS OF LEFT KNEE: ICD-10-CM

## 2023-12-21 DIAGNOSIS — M25.9 WALKING DIFFICULTY DUE TO KNEE JOINT DISORDER: Primary | ICD-10-CM

## 2023-12-21 DIAGNOSIS — R26.2 WALKING DIFFICULTY DUE TO KNEE JOINT DISORDER: Primary | ICD-10-CM

## 2023-12-21 DIAGNOSIS — M25.562 CHRONIC PAIN OF LEFT KNEE: ICD-10-CM

## 2023-12-21 DIAGNOSIS — G89.29 CHRONIC PAIN OF LEFT KNEE: ICD-10-CM

## 2023-12-21 NOTE — PROGRESS NOTES
Physical Therapy                 Therapy Communication Note    Patient Name: Soel Carvajal  MRN: [id number]  Today's Date: 12/21/2023     Discipline: Physical Therapy    Missed Visit Reason:  Therapist called Sole Carvajal to advise her not to come in for today's scheduled PT appointment secondary to not safe for her to ndrive with sling on and potential for falls coming to outpatient PT at this time.    Missed Time: Cancel    Comment: Per 12/12/23 chart note we advised Sole Carvajal to cancel outpatient PT appointments secondary to her high fall risk and inability to drive safely with L UE sling and soft cast on.

## 2024-01-10 NOTE — PROGRESS NOTES
Physical Therapy                 Therapy Communication Note    Patient Name: Sole Carvajal  MRN: [id number]  Today's Date: 1/10/2024     Discipline: Physical Therapy    Missed Visit Reason:  Therapist called Sole Carvajal to advise her not to come in for today's scheduled PT appointment secondary to not safe for her to ndrive with sling on and potential for falls coming to outpatient PT at this time.    Missed Time: Cancel    Comment: Per 12/12/23 chart note we advised Sole Carvajal to cancel outpatient PT appointments secondary to her high fall risk and inability to drive safely with L UE sling and soft cast on.

## 2024-01-11 ENCOUNTER — TREATMENT (OUTPATIENT)
Dept: PHYSICAL THERAPY | Facility: CLINIC | Age: 89
End: 2024-01-11
Payer: MEDICARE

## 2024-01-11 DIAGNOSIS — G89.29 CHRONIC PAIN OF LEFT KNEE: ICD-10-CM

## 2024-01-11 DIAGNOSIS — M25.9 WALKING DIFFICULTY DUE TO KNEE JOINT DISORDER: Primary | ICD-10-CM

## 2024-01-11 DIAGNOSIS — M25.562 CHRONIC PAIN OF LEFT KNEE: ICD-10-CM

## 2024-01-11 DIAGNOSIS — R26.2 WALKING DIFFICULTY DUE TO KNEE JOINT DISORDER: Primary | ICD-10-CM

## 2024-01-11 PROCEDURE — 97530 THERAPEUTIC ACTIVITIES: CPT | Mod: GP | Performed by: PHYSICAL THERAPIST

## 2024-01-11 PROCEDURE — 97110 THERAPEUTIC EXERCISES: CPT | Mod: GP | Performed by: PHYSICAL THERAPIST

## 2024-01-11 NOTE — PROGRESS NOTES
"Physical Therapy    Physical Therapy Evaluation and Treatment      Patient Name: Sole Carvajal  MRN: [id number]  Today's Date: 12/12/23  Visit 3  Time Calculation  Start Time: 1115  Stop Time: 1155  Time Calculation (min): 40 min    Assessment:  Sole Carvajal is walking better better, more upright and less antalgic L LE gait pattern. She has balance difficulty with turns and transition  when posed with environmental obstacle.    Plan:  Gait and balance activities in common environments.    Current Problem:   1. Walking difficulty due to knee joint disorder        2. Chronic pain of left knee                Subjective    Sole Carvajal reports that her L shoulder is doing much better out of cast and sling. She notes that overall she is walking better but L knee pain make doing steps difficult and she feels unsteady walking  Pain:  5/10       Objective   Sole Carvajal did not wear L knee brace today  Cognition:   A + O x3  Observation:  No L upper cast or sling.   She ambulated into clinic with std cane nearly normalized gait pattern    Treatments:    Ther ex:  Seated AROM LAQ 2x10  3 lbs  with isometric hold 5 sec   Kickstand stance 5 x 20 sec each  Seated hamstring curl red tband 2x 10   Alternate foot taps 8\" 2x10  There activity:  Sit to stand from chair  2x5  Yoga block step overs fwd and lateral 6\" with CGA    EDUCATION:  HEP step taps at stairway and railing at home    Goals:  1.Decrease L knee pain to 0-2/10 severity level   2. Increase L knee extension strength to   4/5 MMT grade no pain  3. Improve ambulation ability to least restrictive device/ no device on level surface and steps at least 300   feet community distances   4. Ind Home exercise program   5. Improve outcome score by 10 points  6. Sole Carvajal will go sit to stand x 5 in 20 sec without L knee pain    "

## 2024-01-24 PROBLEM — S83.105D: Status: ACTIVE | Noted: 2024-01-24

## 2024-01-24 NOTE — PROGRESS NOTES
Physical Therapy    Physical Therapy Evaluation and Treatment      Patient Name: Sole Carvajal  MRN: [id number]  Today's Date: 24  Visit 4  Time Calculation  Start Time: 1450  Stop Time: 1540  Time Calculation (min): 50 min    Assessment:  Sole Carvajal is walking better and has at least partially achieved all PT goals. She demonstrates that she can perform an independent Home exercise program and she is ready for discharge from skilled PT  Plan:  Physical Therapy    Discharge Summary    Name: Sole Carvajal  MRN: [id number]  : 1932  Date: 24    Discharge Summary: PT    Discharge Information: Date of discharge 24, Date of last visit 24, and Number of attended visits 4    Therapy Summary: see objective    Discharge Status: see assessment     Rehab Discharge Reason: Achieved all and/or the most significant goals(s)    Current Problem:   1. Walking difficulty due to knee joint disorder        2. Left knee pain        3. Left knee dislocation, subsequent encounter          Subjective    Sole Carvajal reports that she is walking much better but continue to have R > L knee pain. She feels that she is able to walk more upright now and feels that she can continue an independent Home exercise program  Pain:  R knee pain 5/10 , L knee 3/10  R hip pain 3/10   Objective   Sole Carvajal did not wear L knee brace today  Cognition:   A + O x3  Observation:  No L upper cast or sling.   She ambulated into clinic with std cane nearly normalized gait pattern     Knee ROM: Flexion  AROM  R 107 P  L  100 deg , Extension  AROM  R  0 L  0 deg      Knee Strength:  Flex  R 4/5   L4 /5,  Ext R  4/5  L4/5       Ligament testing:  Lachman's  R  L   medial collateral    R  L  lateral collateral   R  L    Jono's   R  L     NT    Palpation: tenderness to entire R LE > than L   Gait: antalgic L LE with cane in R hand   Swelling   circumference:  joint effusion  yes  Mid patella   R  39.5 cm L 40 cm        Special Tests:    SLR quad lag    no        Integument: intact     Outcome Measures:  LEFS: 55/80  Treatments:    Ther ex:  SLR bilat 2x10    Sit to stand from chair  2x5  EDUCATION:  Self management : Extensive discussion regarding regarding joint protection, pain management and exercise progression     Goals:  1.Decrease L knee pain to 0-2/10 severity level PA  2. Increase L knee extension strength to   4/5 MMT grade no painA  3. Improve ambulation ability to least restrictive device/ no device on level surface and steps at least 300   feet community distances A  4. Ind Home exercise program A  5. Improve outcome score by 10 pointsA  6. Sole Carvajal will go sit to stand x 5 in 20 sec without L knee painA

## 2024-01-25 ENCOUNTER — TREATMENT (OUTPATIENT)
Dept: PHYSICAL THERAPY | Facility: CLINIC | Age: 89
End: 2024-01-25
Payer: MEDICARE

## 2024-01-25 DIAGNOSIS — R26.2 WALKING DIFFICULTY DUE TO KNEE JOINT DISORDER: Primary | ICD-10-CM

## 2024-01-25 DIAGNOSIS — M25.9 WALKING DIFFICULTY DUE TO KNEE JOINT DISORDER: Primary | ICD-10-CM

## 2024-01-25 DIAGNOSIS — S83.105D LEFT KNEE DISLOCATION, SUBSEQUENT ENCOUNTER: ICD-10-CM

## 2024-01-25 DIAGNOSIS — M25.562 LEFT KNEE PAIN: ICD-10-CM

## 2024-01-25 PROCEDURE — 97110 THERAPEUTIC EXERCISES: CPT | Mod: GP | Performed by: PHYSICAL THERAPIST

## 2024-01-25 PROCEDURE — 97530 THERAPEUTIC ACTIVITIES: CPT | Mod: GP | Performed by: PHYSICAL THERAPIST

## 2024-02-01 ENCOUNTER — APPOINTMENT (OUTPATIENT)
Dept: PHYSICAL THERAPY | Facility: CLINIC | Age: 89
End: 2024-02-01
Payer: MEDICARE

## 2024-03-18 ENCOUNTER — APPOINTMENT (OUTPATIENT)
Dept: ORTHOPEDIC SURGERY | Facility: HOSPITAL | Age: 89
End: 2024-03-18
Payer: MEDICARE

## 2024-06-28 DIAGNOSIS — G30.1 ALZHEIMER'S DISEASE WITH LATE ONSET (CODE) (MULTI): Primary | ICD-10-CM

## 2024-06-28 DIAGNOSIS — I11.9 HYPERTENSIVE HEART DISEASE WITHOUT HEART FAILURE: ICD-10-CM

## 2024-07-25 ENCOUNTER — HOSPITAL ENCOUNTER (OUTPATIENT)
Dept: RADIOLOGY | Facility: HOSPITAL | Age: 89
Discharge: HOME | End: 2024-07-25
Payer: MEDICARE

## 2024-07-25 DIAGNOSIS — G30.1 ALZHEIMER'S DISEASE WITH LATE ONSET (CODE) (MULTI): ICD-10-CM

## 2024-07-25 PROCEDURE — 70450 CT HEAD/BRAIN W/O DYE: CPT
